# Patient Record
Sex: FEMALE | URBAN - NONMETROPOLITAN AREA
[De-identification: names, ages, dates, MRNs, and addresses within clinical notes are randomized per-mention and may not be internally consistent; named-entity substitution may affect disease eponyms.]

---

## 2020-09-16 ENCOUNTER — TELEPHONE (OUTPATIENT)
Dept: UROLOGY | Facility: CLINIC | Age: 65
End: 2020-09-16

## 2020-09-17 ENCOUNTER — TELEPHONE (OUTPATIENT)
Dept: UROLOGY | Facility: CLINIC | Age: 65
End: 2020-09-17

## 2020-09-17 NOTE — TELEPHONE ENCOUNTER
PT called regarding her referral. Has a super pubis cath, was changed today in mo (09/17/2020) but is wanting to make sure she is able to see Dr Chou before her next scheduled change in one month. Talked to her  and he asked if we could call him back within the next 2 weeks with an update and hopefully a appointment.

## 2020-09-28 ENCOUNTER — TELEPHONE (OUTPATIENT)
Dept: UROLOGY | Facility: CLINIC | Age: 65
End: 2020-09-28

## 2020-09-28 NOTE — TELEPHONE ENCOUNTER
DAVID Griffin ([facility])    Phone [phone]    Dr. Chou wants old records from [facility] and [facility]quelin has sent me a note to get the records.    I called on Friday and again today Monday they state that records were sent Saturday to fax [phone], ATTN:  Dr. Lazcano.  Can you see if you can locate these records for Jacquelin.    Thank you,

## 2020-09-29 NOTE — TELEPHONE ENCOUNTER
Since this is a new patient, we will not make referral to [facility]. I will let the PCP know about this and they can make referral.